# Patient Record
Sex: MALE | Race: WHITE
[De-identification: names, ages, dates, MRNs, and addresses within clinical notes are randomized per-mention and may not be internally consistent; named-entity substitution may affect disease eponyms.]

---

## 2018-10-24 ENCOUNTER — HOSPITAL ENCOUNTER (OUTPATIENT)
Dept: HOSPITAL 61 - PCVCIMAG | Age: 43
Discharge: HOME | End: 2018-10-24
Attending: INTERNAL MEDICINE
Payer: COMMERCIAL

## 2018-10-24 DIAGNOSIS — I10: Primary | ICD-10-CM

## 2018-10-24 PROCEDURE — 93306 TTE W/DOPPLER COMPLETE: CPT

## 2018-10-24 PROCEDURE — 76770 US EXAM ABDO BACK WALL COMP: CPT

## 2018-10-24 PROCEDURE — 93975 VASCULAR STUDY: CPT

## 2018-10-24 NOTE — PCVCIMAG
--------------- APPROVED REPORT --------------





Study performed:  10/24/2018 08:32:50



EXAM: Comprehensive 2D, Doppler, and color-flow 

Echocardiogram

Patient Location: Echo lab

Status:  routine



BSA:         2.54

HR: 19958 bpmBP:          160/110 mmHg

Rhythm: NSR



Other Information 

Study Quality: AdequateTechnically Limited



Risk Factors: 

Cardiac Risk Factors:  HTN, 

Hyperlipidemia



Indications

Hypertension/HDD



2D Dimensions

IVSd:  15.24 (7-11mm)LVOT Diam:  24.07 (18-24mm) 

LVDd:  42.30 mm

LVPWs:  22.87 mm

PWd:  16.66 (7-11mm)Ascending Ao:  41.06 (22-36mm)

LVDs:  29.59 (25-40mm)

Left Atrium:  35.56 (27-40mm)

Aortic Root:  33.26 mm

LV Single Plane 4CH:  47.43 %

LV Single Plane 2CH:  52.96 %

Biplane EF:  46.8 %



Volumes

Left Atrial Volume (Systole)

Single Plane 4CH:  44.67 mLSingle Plane 2CH:  55.43 mL

LA ESV Index:  21.00 mL/m2



Aortic Valve

AoV Peak Bao.:  1.09 m/s

AO Peak Gr.:  4.79 mmHgLVOT Max PG:  3.27 mmHg

LVOT Max V:  0.90 m/s

JANET Vmax: 3.76 cm2



Mitral Valve

E/A Ratio:  1.0

MV Decel. Time:  164.69 ms

MV E Max Bao.:  0.66 m/s

MV A Bao.:  0.68 m/s

IVRT:  131.49 ms



TDI

E/Lateral E':  7.33E/Medial E':  8.25

Medial E' Bao.:  0.08 m/s

Lateral E' Bao.:  0.09 m/s



Pulmonary Valve

PV Peak Bao.:  9.00 m/sPV Peak Gr.:  1.62 mmHg



Pulmonary Vein

P Vein S:    0.34 m/sP Vein A:  0.28 m/s

P Vein D:   0.47 m/sP Vein A Dur.:  107.3 msec

P Vein S/D Ratio:  0.72



Left Ventricle

The left ventricle is normal size. There is normal LV segmental wall 

motion. Mild to moderate concentric left ventricular hypertrophy. 

Left ventricular systolic function is lower end of normal. LVEF is 

55%. The left ventricular diastolic function is normal.



Right Ventricle

The right ventricle is normal size. The right ventricular systolic 

function is normal.



Atria

The left atrium size is normal. The right atrium size is 

normal.



Aortic Valve

The aortic valve is normal in structure. No aortic regurgitation is 

present. There is no aortic valvular stenosis.



Mitral Valve

The mitral valve is normal in structure. There is no mitral valve 

regurgitation noted. No evidence of mitral valve stenosis.



Tricuspid Valve

The tricuspid valve is normal in structure. There is no tricuspid 

valve regurgitation noted.



Pulmonic Valve

The pulmonary valve is normal in structure. There is no pulmonic 

valvular regurgitation.



Great Vessels

The aortic root is normal in size. The ascending aorta is borderline 

dilated. IVC is normal in size and collapses &gt;50% with 

inspiration.



Pericardium

There is no pericardial effusion.



&lt;Conclusion&gt;

The left ventricle is normal size.

Left ventricular systolic function is lower end of normal.

LVEF is 55%.

The aortic valve is normal in structure.

The mitral valve is normal in structure.

The tricuspid valve is normal in structure.

The pulmonary valve is normal in structure.

There is no pericardial effusion.

## 2018-10-24 NOTE — PCVCIMAG
EXAM: BILATERAL RENAL ULTRASOUND AND BILATERAL RENAL DUPLEX



INDICATION: Hypertension



FINDINGS: 

Right kidney: Length measures 11.3 cm. No hydronephrosis or extensive

renal scarring.



Right renal duplex: Adequate technical quality. No sonographic

evidence of renal artery stenosis. The aortic to renal artery ratio is

0.9. The renal vein is patent.



Left kidney: Length measures 11.2 cm. No hydronephrosis or extensive

renal scarring.



Left renal duplex: Adequate technical quality. No sonographic evidence

of renal artery stenosis. The aortic to renal artery ratio is 1.0. The

renal vein is patent.



Bladder: No obvious abnormalities.



IMPRESSION: 

No significant renal artery stenosis.

No hydronephrosis bilaterally.



LOC:[id number]